# Patient Record
Sex: MALE | Race: ASIAN | Employment: FULL TIME | URBAN - METROPOLITAN AREA
[De-identification: names, ages, dates, MRNs, and addresses within clinical notes are randomized per-mention and may not be internally consistent; named-entity substitution may affect disease eponyms.]

---

## 2018-08-26 ENCOUNTER — OFFICE VISIT (OUTPATIENT)
Dept: FAMILY MEDICINE CLINIC | Facility: CLINIC | Age: 63
End: 2018-08-26
Payer: COMMERCIAL

## 2018-08-26 VITALS
SYSTOLIC BLOOD PRESSURE: 128 MMHG | BODY MASS INDEX: 21.86 KG/M2 | HEART RATE: 68 BPM | OXYGEN SATURATION: 98 % | HEIGHT: 66 IN | RESPIRATION RATE: 16 BRPM | WEIGHT: 136 LBS | DIASTOLIC BLOOD PRESSURE: 80 MMHG | TEMPERATURE: 98 F

## 2018-08-26 DIAGNOSIS — L27.0 DERMATITIS DUE TO DRUG REACTION: Primary | ICD-10-CM

## 2018-08-26 PROCEDURE — 99203 OFFICE O/P NEW LOW 30 MIN: CPT | Performed by: NURSE PRACTITIONER

## 2018-08-26 RX ORDER — METHYLPREDNISOLONE 4 MG/1
TABLET ORAL
Qty: 1 KIT | Refills: 0 | Status: SHIPPED | OUTPATIENT
Start: 2018-08-26 | End: 2020-07-15 | Stop reason: ALTCHOICE

## 2018-08-26 NOTE — PATIENT INSTRUCTIONS
Ph?n ?ng D? ?ng,T?ng Quát [Allergic Reaction, Generalized ,Other]  Quý v? ?ang b ? ph?n ?ng d? ?ng. Ph?n ?ng này có th? gây nên ch?ng n?i m?n ng?a, chóng m?t, ng?t x?u, khó th? ho?c khó nu?t, và s?ng m ?t ho?c các ph?n khác c? a c? th ?.  Ph?n ?ng này có v?i bác s? c?a quý v? ho?c v? i c? s ? này nh? ? ã h ? ?ng d?n ho?c n?u các tri?u ch?ng c?a quý v? không b? t ? ? u ? ? h?n. N?u quý v? ? ã b? ph?n ?ng tr?m tr?ng ngày hôm nay ho?c n?u quý v? ? ã t?ng b? denise?u ph?n ?ng d? ?ng nh? gustavo th?i xena v?karine jefferosn, h

## 2018-08-26 NOTE — PROGRESS NOTES
Babatunde Feldman is a 58year old malewho presents with Patient presents with:  Rash Skin Problem (integumentary)    HPI:   Patient is [place] and was speaking rapidly- difficult to understand. [place]  used through [facility].  Patient reporte /80 (BP Location: Left arm, Patient Position: Sitting)   Pulse 68   Temp 97.6 °F (36.4 °C) (Tympanic)   Resp 16   Ht 66\"   Wt 136 lb   SpO2 98%   BMI 21.95 kg/m²   GENERAL: well developed, well nourished,in no apparent distress  SKIN: Rash/lesion(s) 2) Tránh m?c qu?n áo ch?t và b?t c? ?i ?u gì làm da c?a quý v? nóng lên (vòi sen/b?n t? m n? ?c nóng , ánh sáng m?t tr?i tr?c ti?p) vì denise?t làm cho c ? n ng? a càng t? h?n .  3) Tr? ? m n? ? c ?á s ? ch? a ?? ?c nh?ng vùng ng?a ho?c n?i m?n c?c b? . Glenys Lewis © [id number] [address] [facility] [place], [address] [place]. T?t c? các tricia?n ? ??c b?o l?u.  Thông tin này không nh?m thay th? cho d?ch v? ch?m sóc y t? david fernandez. C?n luôn tuân wyatt s? ch? d?n t? chuyên ludmila ch?m sóc s?c lou?

## 2021-12-15 PROBLEM — L27.0 DERMATITIS DUE TO DRUG TAKEN INTERNALLY: Status: ACTIVE | Noted: 2021-12-15
